# Patient Record
Sex: MALE | Race: WHITE | Employment: FULL TIME | URBAN - METROPOLITAN AREA
[De-identification: names, ages, dates, MRNs, and addresses within clinical notes are randomized per-mention and may not be internally consistent; named-entity substitution may affect disease eponyms.]

---

## 2017-09-15 PROBLEM — S00.83XA FACIAL CONTUSION: Status: ACTIVE | Noted: 2017-09-15

## 2017-11-16 ENCOUNTER — OFFICE VISIT (OUTPATIENT)
Dept: ORTHOPEDIC SURGERY | Age: 72
End: 2017-11-16

## 2017-11-16 VITALS — HEIGHT: 71 IN | WEIGHT: 199.96 LBS | BODY MASS INDEX: 27.99 KG/M2

## 2017-11-16 DIAGNOSIS — M25.561 RIGHT KNEE PAIN, UNSPECIFIED CHRONICITY: Primary | ICD-10-CM

## 2017-11-16 DIAGNOSIS — M17.11 PRIMARY OSTEOARTHRITIS OF RIGHT KNEE: ICD-10-CM

## 2017-11-16 PROCEDURE — 20610 DRAIN/INJ JOINT/BURSA W/O US: CPT | Performed by: ORTHOPAEDIC SURGERY

## 2017-11-16 PROCEDURE — 99203 OFFICE O/P NEW LOW 30 MIN: CPT | Performed by: ORTHOPAEDIC SURGERY

## 2017-11-16 PROCEDURE — 73564 X-RAY EXAM KNEE 4 OR MORE: CPT | Performed by: ORTHOPAEDIC SURGERY

## 2017-11-16 RX ORDER — MELOXICAM 15 MG/1
15 TABLET ORAL DAILY
Qty: 30 TABLET | Refills: 3 | Status: SHIPPED | OUTPATIENT
Start: 2017-11-16 | End: 2018-06-28 | Stop reason: ALTCHOICE

## 2017-11-16 NOTE — PROGRESS NOTES
Chief Complaint:  Knee Pain (Right knee pain - no inj, repitive motion )      History of Present Illness:  Mendez Garcia is a 67 y.o. male here regarding right knee pain. Patient states the pain started 3 weeks ago when he was getting out of his car and twisted his knee, the pain is behind his knee. Patient reports the pain has been improving. He denies any gross instability, locking, or catching. He reports intermittent popping and cracking. The patient went to the ED for knee pain on 11/11/17 where he was given a knee immobilizer and instructed to rest and elevate and ice his knee. Patient has been applying Aspercreme on his knee with minimal relief as well as applying ice on his knee at night. He reports the ice has improved his pain. Pain Assessment:  Pain Assessment  Location of Pain: Knee  Location Modifiers: Right  Severity of Pain: 3  Quality of Pain: Popping  Duration of Pain: A few hours  Frequency of Pain: Intermittent  Aggravating Factors: Walking  Limiting Behavior: Some  Relieving Factors: Rest, Ice  Result of Injury: No  Work-Related Injury: No  Are there other pain locations you wish to document?: No    Medical History:  Patient's medications, allergies, past medical, surgical, social and family histories were reviewed and updated as appropriate. Review of Systems:  Constitutional: negative  Respiratory: negative  Cardiovascular: negative  Musculoskeletal:negative except for Knee Pain (Right knee pain - no inj, repitive motion )    Relevant review of systems reviewed and available in the patient's chart    Vital Signs:  Vitals:    11/16/17 1319   Weight: 199 lb 15.3 oz (90.7 kg)   Height: 5' 10.87\" (1.8 m)         General Exam:   Constitutional: Patient is adequately groomed with no evidence of malnutrition  Mental Status: The patient is oriented to time, place and person. The patient's mood and affect are appropriate. Vascular: Examination reveals no swelling or calf tenderness. lateral and sunrise view. No evidence of fracture or dislocation. Assessment :  Osteoarthritis of the right knee    Impression:  Encounter Diagnoses   Name Primary?  Right knee pain, unspecified chronicity Yes    Primary osteoarthritis of right knee        Office Procedures:  Orders Placed This Encounter   Procedures    Knee Bilateral Standard Extended VW     [id number]     Order Specific Question:   Reason for exam:     Answer:   Pain    DC ARTHROCENTESIS ASPIR&/INJ MAJOR JT/BURSA W/O US    DC METHYLPREDNISOLONE 40 MG INJ         Procedures    DC ARTHROCENTESIS ASPIR&/INJ MAJOR JT/BURSA W/O US    DC METHYLPREDNISOLONE 40 MG INJ         Treatment Plan:    Patient was given a cortisone injection in the office. Patient given exercises to complete at home, and a prescription for meloxicam.  Patient to follow up in 4 weeks if no improvement in pain. The risks and benefits of an injection were discussed with the patient. The patient had full opportunity to ask questions and all were answered. The patient then provided verbal informed consent. The skin was then prepped with betadine solution and alcohol. Under aseptic conditions, the  right knee was injected with 4cc of 1% lidocaine, 4cc of 0.25% marcaine and 80 mg of Depomedrol. There were no immediate complications following the injection. The patient was advised of the possibility of injection site reaction and instructed to apply ice to the area.         Esteban Galvan

## 2017-11-16 NOTE — PROGRESS NOTES
Depo-Medrol administration details:  Date & Time: 11/16/17 1:57 PM  Site & Comments:RIGHT KNEE administered by Dr. Huy Eldridge  # CC: 2  [id number]       Lot number: [id number]  Exp: 01/2020     Bupivacaince 0.25%                                            # CC: 4                                                                  ND: 4712-0571-53                                                Lot number: [id number]                                     Exp: 2/1/2019                                                                                                  Lidocaine 1%  #CC: 4  ND: 3930-7474-53  Lot# -[id number]  Exp: 1/1/2019

## 2018-11-23 ENCOUNTER — HOSPITAL ENCOUNTER (EMERGENCY)
Age: 73
Discharge: HOME | End: 2018-11-23
Payer: COMMERCIAL

## 2018-11-23 ENCOUNTER — APPOINTMENT (OUTPATIENT)
Dept: GENERAL RADIOLOGY | Age: 73
End: 2018-11-23
Payer: COMMERCIAL

## 2018-11-23 VITALS
SYSTOLIC BLOOD PRESSURE: 169 MMHG | TEMPERATURE: 97.6 F | BODY MASS INDEX: 28.41 KG/M2 | WEIGHT: 198 LBS | RESPIRATION RATE: 17 BRPM | OXYGEN SATURATION: 99 % | DIASTOLIC BLOOD PRESSURE: 70 MMHG | HEART RATE: 71 BPM

## 2018-11-23 DIAGNOSIS — S60.022A CONTUSION OF LEFT INDEX FINGER WITHOUT DAMAGE TO NAIL, INITIAL ENCOUNTER: Primary | ICD-10-CM

## 2018-11-23 PROCEDURE — 99283 EMERGENCY DEPT VISIT LOW MDM: CPT

## 2018-11-23 PROCEDURE — 73130 X-RAY EXAM OF HAND: CPT

## 2018-11-23 ASSESSMENT — PAIN SCALES - GENERAL: PAINLEVEL_OUTOF10: 8

## 2018-12-04 ENCOUNTER — OFFICE VISIT (OUTPATIENT)
Dept: ORTHOPEDIC SURGERY | Age: 73
End: 2018-12-04
Payer: COMMERCIAL

## 2018-12-04 VITALS — BODY MASS INDEX: 28.37 KG/M2 | HEIGHT: 70 IN | WEIGHT: 198.19 LBS

## 2018-12-04 DIAGNOSIS — S63.631A SPRAIN OF INTERPHALANGEAL JOINT OF LEFT INDEX FINGER, INITIAL ENCOUNTER: ICD-10-CM

## 2018-12-04 DIAGNOSIS — M79.645 FINGER PAIN, LEFT: Primary | ICD-10-CM

## 2018-12-04 PROCEDURE — 1036F TOBACCO NON-USER: CPT | Performed by: ORTHOPAEDIC SURGERY

## 2018-12-04 PROCEDURE — 3017F COLORECTAL CA SCREEN DOC REV: CPT | Performed by: ORTHOPAEDIC SURGERY

## 2018-12-04 PROCEDURE — G8427 DOCREV CUR MEDS BY ELIG CLIN: HCPCS | Performed by: ORTHOPAEDIC SURGERY

## 2018-12-04 PROCEDURE — G8419 CALC BMI OUT NRM PARAM NOF/U: HCPCS | Performed by: ORTHOPAEDIC SURGERY

## 2018-12-04 PROCEDURE — 1101F PT FALLS ASSESS-DOCD LE1/YR: CPT | Performed by: ORTHOPAEDIC SURGERY

## 2018-12-04 PROCEDURE — 4040F PNEUMOC VAC/ADMIN/RCVD: CPT | Performed by: ORTHOPAEDIC SURGERY

## 2018-12-04 PROCEDURE — 99213 OFFICE O/P EST LOW 20 MIN: CPT | Performed by: ORTHOPAEDIC SURGERY

## 2018-12-04 PROCEDURE — 1123F ACP DISCUSS/DSCN MKR DOCD: CPT | Performed by: ORTHOPAEDIC SURGERY

## 2018-12-04 PROCEDURE — G8484 FLU IMMUNIZE NO ADMIN: HCPCS | Performed by: ORTHOPAEDIC SURGERY

## 2018-12-04 NOTE — PROGRESS NOTES
malrotation. Strength:  Tendon function is present. Very mild  weakness in the hand. fingers sensate    Special Tests:  No varus valgus instability at the PIP joint    Skin: There are no additional worrisome rashes, ulcerations or lesions. Gait: normal    Circulation: well perfused        Radiology:     X-rays reviewed in office: (From the emergency room)  EXAMINATION:   3 XRAY VIEWS OF THE LEFT HAND       11/23/2018 9:26 am       COMPARISON:   None.       HISTORY:   ORDERING SYSTEM PROVIDED HISTORY: injury, pain   TECHNOLOGIST PROVIDED HISTORY:   Reason for exam:->injury, pain   Ordering Physician Provided Reason for Exam: Left hand injury; patient states   there was a battery that fell on his hand   Acuity: Acute   Type of Exam: Initial       FINDINGS:   The bony structures, soft tissues and joints are without acute findings   throughout.  No fracture demonstrated, and no dislocation.           Impression   No acute abnormality of the left hand. X-rays obtained and reviewed in office today, impression:  3 views, left index finger, impression:  Concentric PIP joint with out obvious fracture of the middle phalanx or proximal phalanx shaft. Possible small avulsion fracture noted at the PIP joint. The joint itself appears congruent without subluxation or dislocation. Soft tissue swelling present. No lytic lesion    Assessment:  Left index finger PIP joint crush injury with sprain of the soft tissues    Impression:   Encounter Diagnoses   Name Primary?  Finger pain, left Yes    Sprain of interphalangeal joint of left index finger, initial encounter        Office Procedures:  Orders Placed This Encounter   Procedures    XR FINGER LEFT (MIN 2 VIEWS)       Treatment Plan:  Recommendation made for further nonoperative measures. Concentric alignment on x-ray and good stability on exam.  We discussed edema control and provided Coban for wrapping.   I recommended soft tissue massage along with range

## 2019-07-04 ENCOUNTER — HOSPITAL ENCOUNTER (EMERGENCY)
Age: 74
Discharge: HOME | End: 2019-07-04
Payer: COMMERCIAL

## 2019-07-04 ENCOUNTER — APPOINTMENT (OUTPATIENT)
Dept: GENERAL RADIOLOGY | Age: 74
End: 2019-07-04
Payer: COMMERCIAL

## 2019-07-04 VITALS
HEART RATE: 66 BPM | DIASTOLIC BLOOD PRESSURE: 70 MMHG | RESPIRATION RATE: 12 BRPM | TEMPERATURE: 98 F | OXYGEN SATURATION: 100 % | SYSTOLIC BLOOD PRESSURE: 128 MMHG

## 2019-07-04 DIAGNOSIS — L03.031 ABSCESS OR CELLULITIS OF TOE, RIGHT: Primary | ICD-10-CM

## 2019-07-04 DIAGNOSIS — L02.611 ABSCESS OR CELLULITIS OF TOE, RIGHT: Primary | ICD-10-CM

## 2019-07-04 PROCEDURE — 99283 EMERGENCY DEPT VISIT LOW MDM: CPT

## 2019-07-04 PROCEDURE — 73660 X-RAY EXAM OF TOE(S): CPT

## 2019-07-04 RX ORDER — CEPHALEXIN 500 MG/1
500 CAPSULE ORAL 4 TIMES DAILY
Qty: 40 CAPSULE | Refills: 0 | Status: SHIPPED | OUTPATIENT
Start: 2019-07-04 | End: 2019-07-14

## 2019-07-04 RX ORDER — LIDOCAINE 4 G/G
1 PATCH TOPICAL ONCE
Status: DISCONTINUED | OUTPATIENT
Start: 2019-07-04 | End: 2019-07-04

## 2019-07-04 ASSESSMENT — PAIN SCALES - GENERAL: PAINLEVEL_OUTOF10: 4

## 2019-07-04 ASSESSMENT — PAIN DESCRIPTION - PAIN TYPE: TYPE: ACUTE PAIN

## 2020-03-10 ENCOUNTER — HOSPITAL ENCOUNTER (EMERGENCY)
Age: 75
Discharge: HOME | End: 2020-03-10
Payer: COMMERCIAL

## 2020-03-10 VITALS
WEIGHT: 195 LBS | SYSTOLIC BLOOD PRESSURE: 131 MMHG | BODY MASS INDEX: 27.92 KG/M2 | HEART RATE: 90 BPM | DIASTOLIC BLOOD PRESSURE: 72 MMHG | TEMPERATURE: 98.7 F | OXYGEN SATURATION: 99 % | HEIGHT: 70 IN | RESPIRATION RATE: 16 BRPM

## 2020-03-10 PROCEDURE — 99282 EMERGENCY DEPT VISIT SF MDM: CPT

## 2020-03-10 RX ORDER — FEXOFENADINE HCL 180 MG/1
180 TABLET ORAL DAILY
Qty: 30 TABLET | Refills: 0 | Status: SHIPPED | OUTPATIENT
Start: 2020-03-10

## 2020-03-10 RX ORDER — FLUTICASONE PROPIONATE 50 MCG
1 SPRAY, SUSPENSION (ML) NASAL DAILY
Qty: 1 BOTTLE | Refills: 0 | Status: SHIPPED | OUTPATIENT
Start: 2020-03-10 | End: 2021-03-05

## 2020-03-10 RX ORDER — AZITHROMYCIN 250 MG/1
250 TABLET, FILM COATED ORAL SEE ADMIN INSTRUCTIONS
Qty: 6 TABLET | Refills: 0 | Status: SHIPPED | OUTPATIENT
Start: 2020-03-10 | End: 2020-03-15

## 2020-03-10 RX ORDER — IBUPROFEN 400 MG/1
400 TABLET ORAL EVERY 6 HOURS PRN
Qty: 20 TABLET | Refills: 0 | Status: SHIPPED | OUTPATIENT
Start: 2020-03-10

## 2020-03-10 ASSESSMENT — PAIN SCALES - GENERAL: PAINLEVEL_OUTOF10: 1

## 2020-03-10 NOTE — ED PROVIDER NOTES
[facility] Emergency Department    CHIEF COMPLAINT  Facial Swelling (right side x1 week infection in gum from dentures/now notice small amount of swelling)      HISTORY OF PRESENT ILLNESS  Tri Barba is a 76 y.o. male who presents to the ED complaining of right eye watering, right facial swelling, and right upper dental pain. Patient reports symptoms started approximately 1 week. Patient does wear dentures and reports he noticed pain when changing his dentures in the morning. Patient reports he has had dental abscesses and infected teeth in the past and this does not feel like that. Patient denies measurable fever, chills, body aches, dizziness, chest pain, shortness of breath, cough, difficulty swallowing, abdominal pain, nausea, vomiting, diarrhea. Patient also reports nasal drainage and congestion. No other complaints, modifying factors or associated symptoms. Nursing notes reviewed.    Past Medical History:   Diagnosis Date    Cyst of subcutaneous tissue     Diabetes mellitus (HCC)     diet controlled    Epidermal inclusion cyst     Hemorrhoid     History of colonoscopy 5/10    Hyperlipidemia     Hypertension     Polyp of colon     Rectal bleeding      Past Surgical History:   Procedure Laterality Date    APPENDECTOMY      COLONOSCOPY  12/14/10    CYST INCISION AND DRAINAGE  4/1/2013    2 cysts on back    DENTAL SURGERY      HERNIA REPAIR  2/5/4323    umbelical    KNEE SURGERY      left knee [id number]    TONSILLECTOMY       Family History   Problem Relation Age of Onset    Heart Disease Mother 54        MI     Social History     Socioeconomic History    Marital status: Single     Spouse name: Not on file    Number of children: Not on file    Years of education: Not on file    Highest education level: Not on file   Occupational History    Not on file   Social Needs    Financial resource strain: Not on file    Food insecurity     Worry: Not on Physical exam does appear consistent with right maxillary acute sinusitis. I did discuss supportive therapies for acute sinusitis for her sinus pressure and pain. We discussed Jeanna pot and Flonase. Patient verbalized understanding. At this point I do not feel the patient requires further work up and it is reasonable to discharge the patient. A discussion was had with the patient and/or their surrogate regarding diagnosis, diagnostic testing results, treatment/ plan of care, and follow up. There was shared decision-making between myself as well as the patient and/or their surrogate and we are all in agreement with discharge home. There was an opportunity for questions and all questions were answered to the best of my ability and to the satisfaction of the patient and/or patient family. Patient will follow up with pcp for further evaluation/treatment. The patient was given strict return precautions as we discussed symptoms that would necessitate return to the ED. Patient will return to ED for new/worsening symptoms. The patient verbalized their understanding and agreement with the above plan. Please refer to AVS for further details regarding discharge instructions. No results found for this visit on 03/10/20. I estimate there is LOW risk for a ANAPHYLAXIS, DEEP SPACE INFECTION (e.g., WILNERS ANGINA OR RETROPHARYNGEAL ABSCESS), EPIGLOTTITIS, MENINGITIS, or AIRWAY COMPROMISE, thus I consider the discharge disposition reasonable. Also, there is no evidence or peritonitis, sepsis, or toxicity. Adria Leslie and I have discussed the diagnosis and risks, and we agree with discharging home to follow-up with their primary doctor. We also discussed returning to the Emergency Department immediately if new or worsening symptoms occur.  We have discussed the symptoms which are most concerning (e.g., changing or worsening pain, trouble swallowing or breathing, neck stiffness or fever) that necessitate immediate return. Final Impression    1. Acute non-recurrent maxillary sinusitis        Discharge Vital Signs:  Blood pressure 131/72, pulse 90, temperature 98.7 °F (37.1 °C), temperature source Oral, resp. rate 16, height 5' 10\" (1.778 m), weight 195 lb (88.5 kg), SpO2 99 %.  mdm    Patient was sent home with a prescription for below medication/s. I did [place] patient on appropriate use of these medication. New Prescriptions    AZITHROMYCIN (ZITHROMAX) 250 MG TABLET    Take 1 tablet by mouth See Admin Instructions for 5 days 500mg on day 1 followed by 250mg on days 2 - 5    FEXOFENADINE (ALLEGRA ALLERGY) 180 MG TABLET    Take 1 tablet by mouth daily    FLUTICASONE (FLONASE) 50 MCG/ACT NASAL SPRAY    1 spray by Nasal route daily    IBUPROFEN (ADVIL;MOTRIN) 400 MG TABLET    Take 1 tablet by mouth every 6 hours as needed for Pain         FOLLOW UP  Lorenza Smith, [place] [address] [place] [address]   [phone]    Schedule an appointment as soon as possible for a visit   follow up    [facility]  ED  [address]  Go to   As needed, If symptoms worsen      DISPOSITION  Patient was discharged to home in good condition. Comment: Please note this report has been produced using speech recognition software and may contain errors related to that system including errors in grammar, punctuation, and spelling, as well as words and phrases that may be inappropriate. If there are any questions or concerns please feel free to contact the dictating provider for clarification.         [address] Jose M Garza, APRN - CNP  03/10/20 7717

## 2022-01-06 ENCOUNTER — OFFICE VISIT (OUTPATIENT)
Dept: ORTHOPEDIC SURGERY | Age: 77
End: 2022-01-06
Payer: COMMERCIAL

## 2022-01-06 VITALS — BODY MASS INDEX: 27.92 KG/M2 | HEIGHT: 70 IN | WEIGHT: 195 LBS

## 2022-01-06 DIAGNOSIS — M17.32 POST-TRAUMATIC OSTEOARTHRITIS OF LEFT KNEE: Primary | ICD-10-CM

## 2022-01-06 DIAGNOSIS — M25.562 LEFT KNEE PAIN, UNSPECIFIED CHRONICITY: ICD-10-CM

## 2022-01-06 PROCEDURE — G8484 FLU IMMUNIZE NO ADMIN: HCPCS | Performed by: ORTHOPAEDIC SURGERY

## 2022-01-06 PROCEDURE — 99203 OFFICE O/P NEW LOW 30 MIN: CPT | Performed by: ORTHOPAEDIC SURGERY

## 2022-01-06 PROCEDURE — 1123F ACP DISCUSS/DSCN MKR DOCD: CPT | Performed by: ORTHOPAEDIC SURGERY

## 2022-01-06 PROCEDURE — G8419 CALC BMI OUT NRM PARAM NOF/U: HCPCS | Performed by: ORTHOPAEDIC SURGERY

## 2022-01-06 PROCEDURE — 1036F TOBACCO NON-USER: CPT | Performed by: ORTHOPAEDIC SURGERY

## 2022-01-06 PROCEDURE — G8427 DOCREV CUR MEDS BY ELIG CLIN: HCPCS | Performed by: ORTHOPAEDIC SURGERY

## 2022-01-06 PROCEDURE — 4040F PNEUMOC VAC/ADMIN/RCVD: CPT | Performed by: ORTHOPAEDIC SURGERY

## 2022-01-06 NOTE — LETTER
[address] [place] [address] [facility] [address]  Phone: [phone]  Fax: [phone]    Terrie Hagen MD        January 6, 2022     Patient: Keyonna Rivera   YOB: 1945   Date of Visit: 1/6/2022       To Whom it May Concern:    Aruna Ramachandran was seen in my clinic on 1/6/2022. He may return to school on 01/06/2022. If you have any questions or concerns, please don't hesitate to call.     Sincerely,         Terrie Hagen MD

## 2022-01-06 NOTE — PROGRESS NOTES
ORTHOPAEDIC SURGERY H&P / CONSULTATION NOTE    Chief complaint:   Chief Complaint   Patient presents with    Knee Pain     LEFT KNEE PAIN        History of present illness: The patient is a 68 y.o. male with subjective symptoms of left knee pain. The chief complaint is located at left knee. Duration of symptoms has been for 8 days. The severity of symptoms is rated at 0/10 pain on intake form. Patient states that he had tripped over the door sill and ultimately had landed on his chest.  He states potential loss of consciousness. He states that he had left knee pain which was bothersome a day or 2 later but is otherwise since resolved. He is able to ambulate. He denies physical therapy or previous corticosteroid injections. He has been using icy hot as well as occasional patches. He states that he follows up today just to make sure the knee is otherwise doing well. He does report previous history of surgery when he was a teenager given he had injured the knee. He also states known diagnosis of knee arthritis. The injury to his knee was back in 1958. He denies instability in the knee. He denies mechanical twisting knee pain. Denies locking symptoms or symptoms of a loose body    The patient has tried the below listed items prior to today's consultation for above listed chief complaint.     -   Over-the-counter anti-inflammatories/prescription medication anti-inflammatory.      - -   Physical therapy / guided home exercise program -     -   Previous corticosteroid injections    Past medical history:    Past Medical History:   Diagnosis Date    Cyst of subcutaneous tissue     Diabetes mellitus ([facility] [address]     diet controlled    Epidermal inclusion cyst     Hemorrhoid     History of colonoscopy 5/10    Hyperlipidemia     Hypertension     Polyp of colon     Rectal bleeding         Past surgical history:    Past Surgical History:   Procedure Laterality Date    APPENDECTOMY      COLONOSCOPY  12/14/10    CYST INCISION AND DRAINAGE  2013    2 cysts on back   [employer] [address]  3/7/2121    umbelical    KNEE SURGERY      left knee [id number]    TONSILLECTOMY          Allergies:  No Known Allergies      Medications:   Current Outpatient Medications:     ibuprofen (ADVIL;MOTRIN) 400 MG tablet, Take 1 tablet by mouth every 6 hours as needed for Pain, Disp: 20 tablet, Rfl: 0    Bilberry, Vaccinium myrtillus, (BILBERRY PO), Take by mouth, Disp: , Rfl:     Misc Natural Products (LUTEIN 20 PO), Take by mouth, Disp: , Rfl:     Omega-3 Fatty Acids (FISH OIL PO), Take 1,000 mg by mouth daily. , Disp: , Rfl:     Ascorbic Acid (VITAMIN C) 500 MG tablet, Take 500 mg by mouth daily. , Disp: , Rfl:     multivitamin (THERAGRAN) per tablet, Take 1 tablet by mouth daily. , Disp: , Rfl:     lovastatin (MEVACOR) 20 MG tablet, Take 20 mg by mouth nightly., Disp: , Rfl:     lisinopril (PRINIVIL;ZESTRIL) 10 MG tablet, Take 10 mg by mouth daily. , Disp: , Rfl:     fluticasone (FLONASE) 50 MCG/ACT nasal spray, 1 spray by Nasal route daily, Disp: 1 Bottle, Rfl: 0    fexofenadine (ALLEGRA ALLERGY) 180 MG tablet, Take 1 tablet by mouth daily (Patient not taking: Reported on 2022), Disp: 30 tablet, Rfl: 0    hydrochlorothiazide (HYDRODIURIL) 25 MG tablet, Take 25 mg by mouth daily. (Patient not taking: Reported on 2022), Disp: , Rfl:      Social history: Denies IV drug use. Social History     Socioeconomic History    Marital status: Single     Spouse name: Not on file    Number of children: Not on file    Years of education: Not on file    Highest education level: Not on file   Occupational History    Not on file   Tobacco Use    Smoking status: Former Smoker     Quit date: 3/26/2010     Years since quittin.7    Smokeless tobacco: Never Used    Tobacco comment: never smoked cigarettes only cigars   Substance and Sexual Activity    Alcohol use:  Yes     Alcohol/week: 4.0 standard drinks Types: 4 Cans of beer per week     Comment: a week    Drug use: No    Sexual activity: Not on file   Other Topics Concern    Not on file   Social History Narrative    Not on file     Social Determinants of Health     Financial Resource Strain:     Difficulty of Paying Living Expenses: Not on file   Food Insecurity:     Worried About Running Out of Food in the Last Year: Not on file    Aly of Food in the Last Year: Not on file   Transportation Needs:     Lack of Transportation (Medical): Not on file    Lack of Transportation (Non-Medical): Not on file   Physical Activity:     Days of Exercise per Week: Not on file    Minutes of Exercise per Session: Not on file   Stress:     Feeling of Stress : Not on file   Social Connections:     Frequency of Communication with Friends and Family: Not on file    Frequency of Social Gatherings with Friends and Family: Not on file    Attends [employer] Services: Not on file    Active Member of [address] [place] [address] or Organizations: Not on file    Attends Club or Organization Meetings: Not on file    Marital Status: Not on file   Intimate Partner Violence:     Fear of Current or Ex-Partner: Not on file    Emotionally Abused: Not on file    Physically Abused: Not on file    Sexually Abused: Not on file   Housing Stability:     Unable to Pay for Housing in the Last Year: Not on file    Number of [place] in the Last Year: Not on file    Unstable Housing in the Last Year: Not on file     Tobacco use. Social History     Tobacco Use   Smoking Status Former Smoker    Quit date: 3/26/2010    Years since quittin.7   Smokeless Tobacco Never Used   Tobacco Comment    never smoked cigarettes only cigars     Employment: [place]    Workers compensation [place]    Review of systems: Patient denies any fevers chills chest pain shortness of breath nausea vomiting significant weight loss any change in voiding or bowel movements.  Patient denies any significant numbness or tingling at baseline as it relates to this presenting symptom/chief complaint. The patient denies any significant problems with skin or any significant allergies. Physical examination:  Body mass index is 27.98 kg/m². AAOx3, NCAT  EOMI  MMM  RR  Unlabored breathing, no wheezing  Skin intact BUE and BLE, warm and moist  Bilateral lower extremity examination specific to subjective symptoms    Exam Left Lower Extremity  Negative effusion,      2/122/0 active ROM (E/F/Lag), same P assive ROM (E/F/Lag), negative anterior Drawer, 1A Lachman, negative Posterior Drawer,   Stable varus/valgus at 0 and 30?,    none TTP Joint Line, negative Candace, trace Jhonny's  Skin intact throughout  5/5 IP Q H TA G EHL  SILT DP SP LP MP S S  +2 DP pulse    Diagnostic imaging:  MY READ:  4V L Knee 1/6/22:Tricomparmenteal L knee OA, negative fracture, + traction enthesopathy, myositis ossificans/heterotopic ossification/ossification in the suprapatellar space    Pertinent lab work:  None       Diagnosis Orders   1. Post-traumatic osteoarthritis of left knee     2. Left knee pain, unspecified chronicity  XR KNEE LEFT (MIN 4 VIEWS)       Assessment and plan: 68 y.o. male with current subjective symptoms and physical exam findings with diagnostic imaging correlating to left knee osteoarthritis. -Time of 16 minutes was spent coordinating and discussing the clinical findings, reviewing diagnostic imaging as indicated, coordinating care with prior notes review and current clinical encounter documentation as it pertains to the patient's presenting subjective symptoms and diagnoses. -I reviewed with the patient the imaging findings as well as clinical exam and  how it correlates to subjective symptoms.  -I had a pleasant discussion with the patient today. I suspect an acute exacerbation of left knee osteoarthritis. He has no physical exam findings of significance from an acute nature.   He does state previous history of knee surgery knee injury which correlates to his knee osteoarthritis. It does not appear to have any loose body symptoms and his radiographs trend more towards postoperative/post surgical changes given the chronicity and appearance of well-rounded edges of the ossification/calcifications.  -At this time, given this was more of a traumatic injury and denies actual loss of consciousness that then caused the fall, I still then recommended for him to follow-up with his primary care doctor to at least bring them up to speed and if needed to be assessed. Otherwise he denies any loss of memory retrograde or antegrade amnesia and denies any changes with vision.  -At this point he is able to return to low impact activity elliptical stationary bike swimming and walking. I did review with him baseline treatment options and assessment for his knee osteoarthritis at baseline. Attentional consideration in the future for physical therapy and corticosteroid injection intra-articular space left knee. Also potential consideration for NSAID however we will default to primary care manager/PCP given other medications and to limit drug drug interaction.  -Definitive management would be a total knee replacement. Currently the patient does not wish for surgical interventions and so we will continue to conservatively treat his left knee osteoarthritis at this time  -All questions answered to the patient's satisfaction and the patient expressed understanding and agreement with the above listed treatment plan  -Follow up in as needed. A work note was provided for him. -Thank you for the clinical consultation and allowing me to participate in the patient's care. Electronically signed by Sol oT MD on 1/6/22 at 10:30 AM EST         Sol To MD       Orthopaedic Surgery-Sports Medicine        Disclaimer: This note was dictated with voice recognition software.   Though review and correction are routinely performed, please contact the office/medical records for any errors requiring correction.

## 2023-10-15 ENCOUNTER — APPOINTMENT (OUTPATIENT)
Dept: CT IMAGING | Age: 78
End: 2023-10-15
Payer: MEDICARE

## 2023-10-15 ENCOUNTER — HOSPITAL ENCOUNTER (EMERGENCY)
Age: 78
Discharge: HOME | End: 2023-10-15
Attending: STUDENT IN AN ORGANIZED HEALTH CARE EDUCATION/TRAINING PROGRAM
Payer: MEDICARE

## 2023-10-15 VITALS
HEIGHT: 71 IN | WEIGHT: 197 LBS | TEMPERATURE: 97.7 F | DIASTOLIC BLOOD PRESSURE: 58 MMHG | HEART RATE: 69 BPM | SYSTOLIC BLOOD PRESSURE: 133 MMHG | RESPIRATION RATE: 18 BRPM | BODY MASS INDEX: 27.58 KG/M2 | OXYGEN SATURATION: 95 %

## 2023-10-15 DIAGNOSIS — R19.7 DIARRHEA, UNSPECIFIED TYPE: Primary | ICD-10-CM

## 2023-10-15 LAB
ALBUMIN SERPL-MCNC: 4.2 G/DL (ref 3.4–5)
ALBUMIN/GLOB SERPL: 1.1 {RATIO} (ref 1.1–2.2)
ALP SERPL-CCNC: 89 U/L (ref 40–129)
ALT SERPL-CCNC: 17 U/L (ref 10–40)
ANION GAP SERPL CALCULATED.3IONS-SCNC: 14 MMOL/L (ref 3–16)
AST SERPL-CCNC: 31 U/L (ref 15–37)
BACTERIA URNS QL MICRO: ABNORMAL /HPF
BASOPHILS # BLD: 0 K/UL (ref 0–0.2)
BASOPHILS NFR BLD: 0.2 %
BILIRUB SERPL-MCNC: 1.6 MG/DL (ref 0–1)
BILIRUB UR QL STRIP.AUTO: NEGATIVE
BUN SERPL-MCNC: 16 MG/DL (ref 7–20)
C DIFF TOX A+B STL QL IA: NORMAL
CALCIUM SERPL-MCNC: 9 MG/DL (ref 8.3–10.6)
CHLORIDE SERPL-SCNC: 99 MMOL/L (ref 99–110)
CLARITY UR: CLEAR
CO2 SERPL-SCNC: 21 MMOL/L (ref 21–32)
COLOR UR: YELLOW
CREAT SERPL-MCNC: 0.9 MG/DL (ref 0.8–1.3)
DEPRECATED RDW RBC AUTO: 16.8 % (ref 12.4–15.4)
EOSINOPHIL # BLD: 0.2 K/UL (ref 0–0.6)
EOSINOPHIL NFR BLD: 3.2 %
EPI CELLS #/AREA URNS HPF: ABNORMAL /HPF (ref 0–5)
GFR SERPLBLD CREATININE-BSD FMLA CKD-EPI: >60 ML/MIN/{1.73_M2}
GLUCOSE SERPL-MCNC: 116 MG/DL (ref 70–99)
GLUCOSE UR STRIP.AUTO-MCNC: NEGATIVE MG/DL
HCT VFR BLD AUTO: 38.9 % (ref 40.5–52.5)
HGB BLD-MCNC: 13.8 G/DL (ref 13.5–17.5)
HGB UR QL STRIP.AUTO: ABNORMAL
KETONES UR STRIP.AUTO-MCNC: ABNORMAL MG/DL
LEUKOCYTE ESTERASE UR QL STRIP.AUTO: NEGATIVE
LIPASE SERPL-CCNC: 193 U/L (ref 13–60)
LYMPHOCYTES # BLD: 1.5 K/UL (ref 1–5.1)
LYMPHOCYTES NFR BLD: 25.9 %
MCH RBC QN AUTO: 32.2 PG (ref 26–34)
MCHC RBC AUTO-ENTMCNC: 35.5 G/DL (ref 31–36)
MCV RBC AUTO: 90.8 FL (ref 80–100)
MONOCYTES # BLD: 0.7 K/UL (ref 0–1.3)
MONOCYTES NFR BLD: 11.8 %
NEUTROPHILS # BLD: 3.4 K/UL (ref 1.7–7.7)
NEUTROPHILS NFR BLD: 58.9 %
NITRITE UR QL STRIP.AUTO: NEGATIVE
PH UR STRIP.AUTO: 5.5 [PH] (ref 5–8)
PLATELET # BLD AUTO: 152 K/UL (ref 135–450)
PMV BLD AUTO: 7.5 FL (ref 5–10.5)
POTASSIUM SERPL-SCNC: 3.9 MMOL/L (ref 3.5–5.1)
PROT SERPL-MCNC: 8 G/DL (ref 6.4–8.2)
PROT UR STRIP.AUTO-MCNC: 30 MG/DL
RBC # BLD AUTO: 4.29 M/UL (ref 4.2–5.9)
RBC #/AREA URNS HPF: ABNORMAL /HPF (ref 0–4)
SODIUM SERPL-SCNC: 134 MMOL/L (ref 136–145)
SP GR UR STRIP.AUTO: 1.02 (ref 1–1.03)
SPECIMEN STATUS: NORMAL
UA COMPLETE W REFLEX CULTURE PNL UR: ABNORMAL
UA DIPSTICK W REFLEX MICRO PNL UR: YES
URN SPEC COLLECT METH UR: ABNORMAL
UROBILINOGEN UR STRIP-ACNC: 0.2 E.U./DL
WBC # BLD AUTO: 5.8 K/UL (ref 4–11)
WBC #/AREA URNS HPF: ABNORMAL /HPF (ref 0–5)

## 2023-10-15 PROCEDURE — 74177 CT ABD & PELVIS W/CONTRAST: CPT

## 2023-10-15 PROCEDURE — 6360000004 HC RX CONTRAST MEDICATION: Performed by: STUDENT IN AN ORGANIZED HEALTH CARE EDUCATION/TRAINING PROGRAM

## 2023-10-15 PROCEDURE — 87449 NOS EACH ORGANISM AG IA: CPT

## 2023-10-15 PROCEDURE — 80053 COMPREHEN METABOLIC PANEL: CPT

## 2023-10-15 PROCEDURE — 83690 ASSAY OF LIPASE: CPT

## 2023-10-15 PROCEDURE — 87324 CLOSTRIDIUM AG IA: CPT

## 2023-10-15 PROCEDURE — 81001 URINALYSIS AUTO W/SCOPE: CPT

## 2023-10-15 PROCEDURE — 85025 COMPLETE CBC W/AUTO DIFF WBC: CPT

## 2023-10-15 PROCEDURE — 87506 IADNA-DNA/RNA PROBE TQ 6-11: CPT

## 2023-10-15 PROCEDURE — 99285 EMERGENCY DEPT VISIT HI MDM: CPT

## 2023-10-15 RX ADMIN — IOPAMIDOL 75 ML: 755 INJECTION, SOLUTION INTRAVENOUS at 10:15

## 2023-10-15 ASSESSMENT — PAIN SCALES - GENERAL: PAINLEVEL_OUTOF10: 0

## 2023-10-15 ASSESSMENT — PAIN - FUNCTIONAL ASSESSMENT: PAIN_FUNCTIONAL_ASSESSMENT: 0-10

## 2023-10-15 NOTE — ED PROVIDER NOTES
[facility]  ED  EMERGENCY DEPARTMENT ENCOUNTER        Patient Name: Jun Friend  MRN: [id number]  [address] [facility] [place] 1945  Date of evaluation: 10/15/2023  Provider: Simone Parry MD  PCP: Brando Lincoln MD  Note Started: 9:37 AM EDT 10/15/23      CHIEF COMPLAINT  Diarrhea         HISTORY & PHYSICAL     HISTORY OF PRESENT ILLNESS  History from : Patient    Limitations to history : None    Jnu Friend is a 66 y.o. male  has a past medical history of Cyst of subcutaneous tissue, Diabetes mellitus ([address]), Epidermal inclusion cyst, Hemorrhoid, History of colonoscopy (5/10), Hyperlipidemia, Hypertension, Polyp of colon, and Rectal bleeding., who presents to the ED complaining of diarrhea. Onset: 12d, ate a sandwich that tasted off  Denies abdominal pain  Nausea/Vomiting: denies  Diarrhea/Constipation: nonbloody diarrhea, ~3 episodes daily  Dysuria/urinary frequency: denies  Fever: denies  Previous abdominal surgeries: denies  Denies recent travel or antibiotics    Old records reviewed: No pertinent information noted. No other complaints, modifying factors or associated symptoms. Nursing Notes were all reviewed and agreed with or any disagreements were addressed in the HPI. I have reviewed the following from the nursing documentation.     Past Medical History:   Diagnosis Date    Cyst of subcutaneous tissue     Diabetes mellitus (HCC)     diet controlled    Epidermal inclusion cyst     Hemorrhoid     History of colonoscopy 5/10    Hyperlipidemia     Hypertension     Polyp of colon     Rectal bleeding      Past Surgical History:   Procedure Laterality Date    APPENDECTOMY      COLONOSCOPY  12/14/10    CYST INCISION AND DRAINAGE  4/1/2013    2 cysts on back    DENTAL SURGERY      HERNIA REPAIR  8/2/4816    umbelical    KNEE SURGERY      left knee 5202,9704    TONSILLECTOMY       Family History   Problem Relation Age of Onset    Heart Disease Mother 54        MI     Social History

## 2023-10-15 NOTE — ED NOTES
[address] [place] [address]  ED  EMERGENCY DEPARTMENT ENCOUNTER        Patient Name: Donte Fried  MRN: [id number]  [address] [facility] 1945  Date of evaluation: 10/15/2023  PCP: Starleen Holstein, MD  Note Started: 9:06 AM EDT 10/15/23      CHIEF COMPLAINT  Diarrhea (X 10 days)         HISTORY & PHYSICAL     HISTORY OF PRESENT ILLNESS  History from : Patient    Limitations to history : None    Donte Fried is a 66 y.o. male  has a past medical history of Cyst of subcutaneous tissue, Diabetes mellitus ([address]), Epidermal inclusion cyst, Hemorrhoid, History of colonoscopy (5/10), Hyperlipidemia, Hypertension, Polyp of colon, and Rectal bleeding., who presents to the ED complaining of diarrhea. About 12 days ago the patient reports that he ate a sandwich that tasted strange, one day later he began to have diarrhea. This resolved for a day but then began again the next day and has not stopped since. He has approximately 3 episodes per day of brown, non bloody, diarrhea. He reports feeling fatigued as well. He has been increasing his fluid intake but has not been eating as much as usual. There is no associated nausea, vomiting or abdominal pain. He has not taken anything at home for his symptoms. Also states that several coworkers have had similar GI illness lately. No other complaints, modifying factors or associated symptoms. Nursing Notes were all reviewed and agreed with or any disagreements were addressed in the HPI. I have reviewed the following from the nursing documentation.     Past Medical History:   Diagnosis Date    Cyst of subcutaneous tissue     Diabetes mellitus ([address])     diet controlled    Epidermal inclusion cyst     Hemorrhoid     History of colonoscopy 5/10    Hyperlipidemia     Hypertension     Polyp of colon     Rectal bleeding      Past Surgical History:   Procedure Laterality Date    APPENDECTOMY      COLONOSCOPY  12/14/10    CYST INCISION AND DRAINAGE  4/1/2013    2 cysts on back

## 2023-10-16 LAB — GI PATHOGENS PNL STL NAA+PROBE: NORMAL

## 2024-08-12 ENCOUNTER — HOSPITAL ENCOUNTER (OUTPATIENT)
Dept: GENERAL RADIOLOGY | Age: 79
Discharge: HOME | End: 2024-08-12
Payer: COMMERCIAL

## 2024-08-12 DIAGNOSIS — S60.211A CONTUSION OF RIGHT WRIST, INITIAL ENCOUNTER: ICD-10-CM

## 2024-08-12 DIAGNOSIS — L08.9 INFECTED ABRASION OF RIGHT KNEE, INITIAL ENCOUNTER: ICD-10-CM

## 2024-08-12 DIAGNOSIS — S80.211A INFECTED ABRASION OF RIGHT KNEE, INITIAL ENCOUNTER: ICD-10-CM

## 2024-08-12 DIAGNOSIS — S63.501A SPRAIN OF FOREARM, RIGHT, INITIAL ENCOUNTER: ICD-10-CM

## 2024-08-12 PROCEDURE — 73110 X-RAY EXAM OF WRIST: CPT

## 2024-08-12 PROCEDURE — 73090 X-RAY EXAM OF FOREARM: CPT

## 2024-08-12 PROCEDURE — 73130 X-RAY EXAM OF HAND: CPT
